# Patient Record
Sex: MALE | Race: WHITE | NOT HISPANIC OR LATINO | Employment: FULL TIME | URBAN - METROPOLITAN AREA
[De-identification: names, ages, dates, MRNs, and addresses within clinical notes are randomized per-mention and may not be internally consistent; named-entity substitution may affect disease eponyms.]

---

## 2017-12-01 ENCOUNTER — HOSPITAL ENCOUNTER (EMERGENCY)
Facility: HOSPITAL | Age: 22
Discharge: HOME | End: 2017-12-01
Attending: EMERGENCY MEDICINE
Payer: COMMERCIAL

## 2017-12-01 VITALS
RESPIRATION RATE: 16 BRPM | DIASTOLIC BLOOD PRESSURE: 63 MMHG | WEIGHT: 225 LBS | BODY MASS INDEX: 30.48 KG/M2 | OXYGEN SATURATION: 100 % | HEART RATE: 82 BPM | TEMPERATURE: 98.4 F | SYSTOLIC BLOOD PRESSURE: 135 MMHG | HEIGHT: 72 IN

## 2017-12-01 DIAGNOSIS — T15.91XA EYE FOREIGN BODY, RIGHT, INITIAL ENCOUNTER: Primary | ICD-10-CM

## 2017-12-01 PROCEDURE — 99283 EMERGENCY DEPT VISIT LOW MDM: CPT

## 2017-12-01 RX ORDER — OFLOXACIN 3 MG/ML
1 SOLUTION/ DROPS OPHTHALMIC 4 TIMES DAILY
Qty: 5 ML | Refills: 0 | Status: SHIPPED | OUTPATIENT
Start: 2017-12-01 | End: 2017-12-06

## 2017-12-01 RX ORDER — PROPARACAINE HYDROCHLORIDE 5 MG/ML
2 SOLUTION/ DROPS OPHTHALMIC ONCE
Status: COMPLETED | OUTPATIENT
Start: 2017-12-01 | End: 2017-12-01

## 2017-12-01 RX ADMIN — FLUORESCEIN SODIUM 1 STRIP: 1 STRIP OPHTHALMIC at 20:13

## 2017-12-01 RX ADMIN — PROPARACAINE HYDROCHLORIDE 2 DROP: 5 SOLUTION/ DROPS OPHTHALMIC at 20:13

## 2017-12-02 NOTE — ED ATTENDING ATTESTATION
Kirill Tomas MD, saw and evaluated the patient  All available labs and X-rays were ordered by me or the resident and have been reviewed by myself  I discussed the patient with the resident / non-physician and agree with the resident's / non-physician practitioner's findings and plan as documented in the resident's / non-physician practicitioner's note, except where noted  At this point, I agree with the current assessment done in the ED  Chief Complaint   Patient presents with    Foreign Body in Eye     Pt was working on his car yesterday, has been putting eye drops in his eye, pt noticed a piece of metal in his eye today  Last night he was grinding metal and felt something stuck in his eye  This morning it was very painful  Very red  Tetanus up to date within 10 years  PE:  Vitals:    12/01/17 1915   BP: 135/63   Pulse: 82   Resp: 16   Temp: 98 4 °F (36 9 °C)   SpO2: 100%   Weight: 102 kg (225 lb)   Height: 5' 11 75" (1 822 m)   On eye exam: has obvious piece of metal with rust rung around it present  Nothing found on eversion of eyelids  No entrapement  A/P:  - topical abx  - f/u optho for removal  - attempted here with resident and was unable to remove  - no signs of metal elsewhere in eye    - 13 point ROS was performed and all are normal unless stated in the history above  - Nursing note reviewed  Vitals reviewed  - Orders placed by myself and/or advanced practitioner / resident     - Previous chart was not reviewed  - No language barrier    - History obtained from patient  - There are no limitations to the history obtained  - Critical care time: Not applicable for this patient  Final Diagnosis:  1   Eye foreign body, right, initial encounter        ED Course      Medications   fluorescein sodium sterile 1 mg ophthalmic strip 1 strip (1 strip Both Eyes Given by Other 12/1/17 2013)   proparacaine (ALCAINE) 0 5 % ophthalmic solution 2 drop (2 drops Both Eyes Given by Other 12/1/17 2013)     No orders to display     No orders of the defined types were placed in this encounter  Labs Reviewed - No data to display  Time reflects when diagnosis was documented in both MDM as applicable and the Disposition within this note     Time User Action Codes Description Comment    12/1/2017  9:04 PM Luda, [address] foreign body, right, initial encounter       ED Disposition     ED Disposition Condition Comment    Discharge  Langston Finely discharge to home/self care  Condition at discharge: Good        Follow-up Information     Follow up With Specialties Details Why Contact Info Additional [address] Emergency Department Emergency Medicine Go to If symptoms worsen [address]  [phone]  ED, [address] [place] [address] MD Arias Ophthalmology Schedule an appointment as soon as possible for a visit in 2 days For re-check and foreign body removal  Christiano Best 66  [id number] [address] for Sight   Call in 2 days For re-check [facility] for [facility]ight   Democr[place] [address] [place] [address]  [phone]           Discharge Medication List as of 12/1/2017  9:08 PM      START taking these medications    Details   ofloxacin (OCUFLOX) 0 3 % ophthalmic solution Administer 1 drop to both eyes 4 (four) times a day for 5 days, Starting Fri 12/1/2017, Until Wed 12/6/2017, Print           No discharge procedures on file  None       Portions of the record may have been created with voice recognition software  Occasional wrong word or "sound a like" substitutions may have occurred due to the inherent limitations of voice recognition software  Read the chart carefully and recognize, using context, where substitutions have occurred      Electronically signed by:  Annita Purvis

## 2017-12-02 NOTE — ED PROVIDER NOTES
History  Chief Complaint   Patient presents with    Foreign Body in Eye     Pt was working on his car yesterday, has been putting eye drops in his eye, pt noticed a piece of metal in his eye today  71-year-old male with no past medical history who presents for evaluation after foreign body sensation in his right eye  Patient states he was grinding metal compartment on his car yesterday evening roughly 24 hours prior to arrival when he had a foreign body sensation in his right eye  He states he was wearing safety goggles however the foreign body sensation persisted  He had excessive tearing and mild irritation in the right eye  Left eye is unremarkable  He denies any change in visual acuity, visual field deficit or any deviation from his normal vision  On exam the patient has an erythematous/injected right eye, tearing, foreign body that is small pinpoint size at the 4 o'clock position not within the visual access  Lids were inverted and there is no evidence of additional foreign bodies  The eye was stained which did show a small corneal abrasion but no ulcer  Negative María Elena is  None       History reviewed  No pertinent past medical history  History reviewed  No pertinent surgical history  History reviewed  No pertinent family history  I have reviewed and agree with the history as documented  Social History   Substance Use Topics    Smoking status: Never Smoker    Smokeless tobacco: Never Used    Alcohol use No        Review of Systems   Constitutional: Negative for chills, fatigue and fever  HENT: Negative for congestion and sore throat  Eyes: Positive for pain and redness  Negative for photophobia, discharge, itching and visual disturbance  Respiratory: Negative for cough, chest tightness, shortness of breath and wheezing  Cardiovascular: Negative for chest pain, palpitations and leg swelling     Gastrointestinal: Negative for abdominal pain, blood in stool, diarrhea, nausea and vomiting  Genitourinary: Negative for difficulty urinating, dysuria and hematuria  Musculoskeletal: Negative for gait problem  Skin: Negative for rash  Neurological: Negative for dizziness, syncope, weakness, light-headedness, numbness and headaches  Hematological: Negative for adenopathy  Psychiatric/Behavioral: Negative for sleep disturbance  Physical Exam  ED Triage Vitals [12/01/17 1915]   Temperature Pulse Respirations Blood Pressure SpO2   98 4 °F (36 9 °C) 82 16 135/63 100 %      Temp src Heart Rate Source Patient Position - Orthostatic VS BP Location FiO2 (%)   -- -- -- -- --      Pain Score       5           Orthostatic Vital Signs  Vitals:    12/01/17 1915   BP: 135/63   Pulse: 82       Physical Exam   Constitutional: He is oriented to person, place, and time  He appears well-developed and well-nourished  No distress  HENT:   Head: Normocephalic and atraumatic  Eyes: EOM and lids are normal  Pupils are equal, round, and reactive to light  Lids are everted and swept, no foreign bodies found  Right eye exhibits no chemosis, no discharge and no exudate  Left eye exhibits no chemosis, no discharge and no exudate  No foreign body present in the left eye  Right conjunctiva is injected  Right conjunctiva has no hemorrhage  Left conjunctiva is injected  Left conjunctiva has no hemorrhage  Right eye exhibits normal extraocular motion and no nystagmus  Left eye exhibits normal extraocular motion and no nystagmus  Slit lamp exam:       The right eye shows corneal abrasion and foreign body  The right eye shows no hyphema, no hypopyon, no fluorescein uptake and no anterior chamber bulge  The left eye shows no corneal abrasion, no foreign body, no hyphema, no hypopyon, no fluorescein uptake and no anterior chamber bulge  Unable to remove foreign body with dampened Q-tip as well as Spud       Patient has 20/20 vision unilaterally with the right, left and binocular vision with bedside snellen   Neck: Normal range of motion  No JVD present  Cardiovascular: Normal rate and regular rhythm  No murmur heard  Pulmonary/Chest: Effort normal and breath sounds normal  He has no wheezes  Abdominal: Soft  He exhibits no distension  There is no tenderness  Neurological: He is alert and oriented to person, place, and time  Skin: Skin is warm and dry  No rash noted  He is not diaphoretic  Psychiatric: He has a normal mood and affect  Nursing note and vitals reviewed  ED Medications  Medications   fluorescein sodium sterile 1 mg ophthalmic strip 1 strip (1 strip Both Eyes Given by Other 12/1/17 2013)   proparacaine (ALCAINE) 0 5 % ophthalmic solution 2 drop (2 drops Both Eyes Given by Other 12/1/17 2013)       Diagnostic Studies  Results Reviewed     None                 No orders to display         Procedures  Procedures      Phone Consults  ED Phone Contact    ED Course  ED Course       unable to remove foreign body with wet Q-tip or ice bud  Evidence of corneal abrasion without ulcer  Negative María Elena is  Patient provided with antibiotic eyedrops, provided with multiple ophthalmology office is for immediate follow-up  Patient agreeable and understands return precautions  MDM  CritCare Time    Disposition  Final diagnoses:   Eye foreign body, right, initial encounter     Time reflects when diagnosis was documented in both MDM as applicable and the Disposition within this note     Time User Action Codes Description Comment    12/1/2017  9:04 PM Funmilayo La[address] foreign body, right, initial encounter       ED Disposition     ED Disposition Condition Comment    Discharge  Zoraida Carvajal discharge to home/self care      Condition at discharge: Good        Follow-up Information     Follow up With Specialties Details Why [address] [facility] Emergency Department Emergency Medicine Go to If symptoms worsen [address]  [phone]  ED, [address] Josue Bianchi MD Ophthalmology Schedule an appointment as soon as possible for a visit in 2 days For re-check and foreign body removal  Christiano Best 66  [address] [address] for Sight   Call in 2 days For re-check [facility] for [facility]ight   Democr[place] [address] [place] [address]  [phone]           Discharge Medication List as of 12/1/2017  9:08 PM      START taking these medications    Details   ofloxacin (OCUFLOX) 0 3 % ophthalmic solution Administer 1 drop to both eyes 4 (four) times a day for 5 days, Starting Fri 12/1/2017, Until Wed 12/6/2017, Print           No discharge procedures on file  ED Provider  Attending physically available and evaluated Lev Jeffery I managed the patient along with the ED Attending      Electronically Signed by         Shaunna Esparza DO  Resident  12/01/17 2543